# Patient Record
Sex: FEMALE | Race: BLACK OR AFRICAN AMERICAN | Employment: STUDENT | URBAN - METROPOLITAN AREA
[De-identification: names, ages, dates, MRNs, and addresses within clinical notes are randomized per-mention and may not be internally consistent; named-entity substitution may affect disease eponyms.]

---
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
## 2024-01-25 NOTE — H&P
January 25, 2024     Patient: Duglas Cortes  YOB: 2007  Date of Visit: 1/25/2024      To Whom it May Concern:    Duglas Cortes is under my professional care. Duglas was seen in my office on 1/25/2024. Duglas may return to school on 1/25/2024 .    If you have any questions or concerns, please don't hesitate to call.         Sincerely,          Cheryl Portillo MD        CC: No Recipients   [address] [facility] [address]  Child and Adolescent Psychiatry  Inpatient Admission Note    Date of Service:  08/22/17    Historian(s)/Guardian(s): Pt and parents   Referral Source: [facility] named [facility]    Chief Complaint   overdose    History of Present Illness   Shamir Goldman is a 16  y.o. 5  m.o.  female with a history of major depressive disorder who presents voluntarily for inpatient psychiatric hospitalization after an impulsive overdose on 10-15 tablets of escitalopram at 230PM on 8/21/17. Pt was initially evaluated at [facility] then transferred to [facility] for evaluation. ON initial assessment, Daryl Banda reported regret for her overdose. She denied any specific triggers other than decreased communication with parents. Pt has been feeling that her parents are not supportive but she had difficulty given examples of their lack of support. Overall pt said that she was \"fine\" until she overdosed. Pt explained that her depression started 7 years ago but she initiated treatment May 2017 on escitalopram. Pt denied any benefits from this medication to date. Pt reports depressed mood with isolation and feelings of worthlessness for \"a while. \" These symptoms are episodic and can last several days to several weeks. Parents were present for collateral. They are not aware of any specific triggers. Pt agreed to medication adjustments. Parents are concerned whether non-compliance on escitalopram contributed to syncopal episode one month ago. Psychiatric Review of Systems   Anxiety: Denies any excessive worrying, social anxiety, panic attacks and OCD. Irritability: Denies low threshold of frustration or anger. Bipolar symptoms: Denies history of decreased need for sleep associated with increased energy, racing thoughts, rapid speech and risky behavior. Disruptive Behaviors: Denies verbal/physical aggressiveness, property destruction, stealing, setting fires, or harming animals. ADHD:  Denies difficulty with inattention, hyperactivity or impulsivity. Medical Review of Systems     Sleep: stable  Appetite: stable    Pt reported syncopal episode 1 month ago    14 point review of systems was completed. Significant findings are found in the HPI or MSE. Psychiatric Treatment History     Self-injurious behavior/risky thoughts or behaviors (past suicidal ideation/attempt): Denies any prior history of thoughts of self-harm or suicidal actions. Violence/Risk to others (past homicidal ideation/attempt):   Denies any prior history of violence or homicidal ideation. Previous psychiatric medication trials: none    Previous psychiatric hospitalizations: [facility] 4/2017    Current therapist: Nita Kapoor    Current psychiatric provider: [facility]    Allergies    Allergies not on file    Medical History     Past Medical History:   Diagnosis Date    Eczema        History of neurological illness: none  History of head injuries: none     Medication(s)     No current facility-administered medications on file prior to encounter. No current outpatient prescriptions on file prior to encounter. Substance Abuse History     Tobacco: denied  Alcohol: denied  Marijuana: denied  Cocaine: denied  Opiate: denied  Benzodiazepine: denied  Other: denied    History of detox: none    History of substance abuse treatment: none    Family History       Psychiatric Family History  Maternal: none  Paternal: none    Family history of suicide?  NO    Social History     Living Situation/Parents/Guardians: lives with mom, step-dad and 2 sisters in [place]    Interests: aspires to study psychology, wants to attend college    Education:   Current School/Grade: rising senior at [employer]   Academic Performance: failed chemistry, fair grades   Repeated Grade(s): none   IEP/504: none   Truancy: none   ISS/OSS: denies   Bullying: denies     Relationships: heterosexual    Legal: Arrests? none  Probation? none  Juvenile [place] stays? none    Vitals/Labs      Vitals:    08/22/17 1043   BP: 128/84   Pulse: 82   Resp: 18   Temp: 97.7 °F (36.5 °C)       OSH Labs: UDS negative, CMP wnl, CBC wnl, ETOH undetected, UPT negative    Mental Status Examination     Appearance/Hygiene 16  female   Behavior/Social Relatedness Appropriate   Musculoskeletal Gait/Station: appropriate  Tone (flaccid, cogwheeling, spastic): appropriate  Psychomotor (hyperkinetic, hypokinetic): appropriate   Involuntary movements (tics, dyskinesias, akathisa, stereotypies): none   Speech   Rate, rhythm, volume, fluency and articulation are appropriate   Mood   sad   Affect    sad   Thought Process Linear and goal directed    Vagueness, incoherence, circumstantiality, tangentiality, neologisms, perseveration, flight of ideas, or self-contradictory statements not present on assessment   Thought Content and Perceptual Disturbances Denies delusions, ideas of reference, overvalued ideas, ruminations, obsession, compulsions, and phobias    Denies self-injurious behavior (SIB), suicidal ideation (SI), aggressive behavior or homicidal ideation (HI)    Denies auditory and visual hallucinations   Sensorium and Cognition  AOx4, attention intact, memory intact, language use appropriate, and fund of knowledge age appropriate   Insight  fair   Judgment fair       Suicide Risk Assessment   Suicide Risk Assessment    Admission  Date/Time: 08/22/17    [x] Admission  [] Discharge     Key Factors:   Current admission precipitated by suicide attempt?   [x]  Yes     2    []  No     1     Suicide Attempt History  [x] Past attempts of high lethality    2 []  Past attempts of low lethality    1 []  No previous attempts       0   Suicidal Ideation []  Constant suicidal thoughts      2 []  Intermittent or fleeting suicidal  thoughts  1 [x]  Denies current suicidal thoughts    0   Suicide Plan   []  Has plan with actual OR potential access to planned method    2 []  Has plan without access to planned method      1 [x]  No plan            0   Plan Lethality []  Highly lethal plan (Carbon monoxide, gun, hanging, jumping)    2 []  Moderate lethality of plan          1 [x]  Low lethality of plan (biting, head banging, superficial scratching, pillow over face)  0   Safety Plan Agreement  []  Unwilling OR unable to agree due to impaired reality testing   2   []  Patient is ambivalent and/or guarded      1 [x]  Reliably agrees        0   Current Morbid Thoughts (reunion fantasies, preoccupations with death) []  Constantly     2     []  Frequently    1 [x]  Rarely    0   Elopement Risk  []  High risk     2 []  Moderate risk    1 [x]   Low risk    0   Symptoms    []  Hopeless  []  Helpless  []  Anhedonia   []  Guilt/shame  []  Anger/rage  []  Anxiety  []  Insomnia   []  Agitation   []  Impulsivity  []  5-6 symptoms present    2 []  3-4 symptoms present    1  [x]  0-2 symptoms present    0     Scoring Key:  10 or higher = Imminent Risk (consider 1:1)  4 - 9 = Moderate Risk (consider q 15 minute observation)Attended alcohol, tobacco, prescription and other drug psychoeducation group.   0 - 3 = Low Risk (consider q 30 minute observation)    Total Score: 4  ------------------------------------------------------------------------------------------------------------------  PLEASE ADDRESS THE FOLLOWING 5 ISSUES     Physician's Subjective Appraisal of Risk (check one):  []  Patient replies not trustworthy: several non-verbal cues. []  Patient replies questionable: trustworthy: at least 1 non-verbal cue. [x]  Patient replies appear trustworthy. Family History of Suicide?    []  Yes  [x]  No    Protective measures (select all that apply):  []  Successful past responses to stress  []  Spiritual/Anglican beliefs  []  Capacity for reality testing  []  Positive therapeutic relationships  [x]  Social supports/connections  [x]  Positive coping skills  []  Frustration tolerance/optimism  []  Children or pets in the home  [x]  Sense of responsibility to family  [x]  Agrees to treatment plan and follow up    Others (list):    High Risk Diagnoses (select all that apply):  [x]  Depression/Bipolar Disorder  []  Dual Diagnosis  []  Cardiovascular Disease  []  Schizophrenia  []  Chronic Pain  []  Epilepsy  []  Cancer  []  Personality Disorder  []  HIV/AIDS  []  Multiple Sclerosis    Dangerousness Assessment (Suicide, homicide, property destruction. ..)    Risk Factors reviewed and risk assessed to be:  [] low  [] low-moderate  [] moderate   [x] moderate-high  [] high     Protection factors reviewed and risk assessed to be:  [] low  [x] low-moderate  [] moderate   [] moderate-high  [] high     Response to treatment and risk assessed to be:  [] low  [] low-moderate  [x] moderate   [] moderate-high  [] high     Support reviewed and risk assessed to be:  [x] low  [] low-moderate  [] moderate   [] moderate-high  [] high     Acceptance of Discharge and outpatient treatment reviewed and risk assessed to be:    [x] low  [] low-moderate  [] moderate   [] moderate-high  [] high   Overall risk assessed to be:  [] low  [] low-moderate  [x] moderate   [] moderate-high  [] high       Assessment and Plan     Psychiatric Diagnoses: major depressive disorder, recurrent, severe    Medical Diagnoses: eczema    Psychosocial and contextual factors: none    Level of impairment/disability: severe    Becky Casarez is a 16 y.o. who is currently requires acute stabilization after an impulsive overdose on her prescribed escitalopram. Will monitor safety and reinforce coping skills. 1. Admit to locked inpatient behavioral health unit. Start milieu, group, art and occupation therapy. 2. Discontinue escitalopram due to ineffectiveness; also medication could be causing acute withdrawal.   3. Start fluoxetine 10mg (longer half life to reduce risk of acute withdrawal)  4.  Routine labs ordered and reviewed by this provider. 5. Reviewed instructions, risks, benefits and side effects. Parent/Guardian gave approval to start medications with dose adjustments. 6. Disposition: SW will assist in coordinating return to outpatient team  7. Family Session: Friday  8.  Tentative date of discharge: Friday       Molly Robb MD  Psychiatrist  [facility]